# Patient Record
Sex: FEMALE
[De-identification: names, ages, dates, MRNs, and addresses within clinical notes are randomized per-mention and may not be internally consistent; named-entity substitution may affect disease eponyms.]

---

## 2024-08-13 ENCOUNTER — APPOINTMENT (OUTPATIENT)
Dept: NEUROLOGY | Facility: CLINIC | Age: 44
End: 2024-08-13

## 2024-08-13 VITALS
HEIGHT: 62 IN | OXYGEN SATURATION: 97 % | DIASTOLIC BLOOD PRESSURE: 68 MMHG | HEART RATE: 78 BPM | SYSTOLIC BLOOD PRESSURE: 105 MMHG | BODY MASS INDEX: 28.71 KG/M2 | WEIGHT: 156 LBS

## 2024-08-13 DIAGNOSIS — M79.2 NEURALGIA AND NEURITIS, UNSPECIFIED: ICD-10-CM

## 2024-08-13 DIAGNOSIS — Z86.39 PERSONAL HISTORY OF OTHER ENDOCRINE, NUTRITIONAL AND METABOLIC DISEASE: ICD-10-CM

## 2024-08-13 DIAGNOSIS — G57.10 MERALGIA PARESTHETICA, UNSPECIFIED LOWER LIMB: ICD-10-CM

## 2024-08-13 DIAGNOSIS — R68.89 OTHER GENERAL SYMPTOMS AND SIGNS: ICD-10-CM

## 2024-08-13 PROBLEM — Z00.00 ENCOUNTER FOR PREVENTIVE HEALTH EXAMINATION: Status: ACTIVE | Noted: 2024-08-13

## 2024-08-13 PROCEDURE — 99204 OFFICE O/P NEW MOD 45 MIN: CPT

## 2024-08-13 RX ORDER — CYANOCOBALAMIN (VITAMIN B-12) 1000 MCG
1000 TABLET ORAL DAILY
Qty: 30 | Refills: 5 | Status: ACTIVE | COMMUNITY
Start: 2024-08-13 | End: 1900-01-01

## 2024-08-13 RX ORDER — LIDOCAINE 5% 700 MG/1
5 PATCH TOPICAL
Qty: 30 | Refills: 2 | Status: ACTIVE | COMMUNITY
Start: 2024-08-13 | End: 1900-01-01

## 2024-08-13 NOTE — HISTORY OF PRESENT ILLNESS
[FreeTextEntry1] : 42 yo RH Female with PMHx of iron deficiency anemia came in today for evaluation of forgetfulness and RIght lateral thigh pain.   Forgetfulness start almost 1 year back when she and  started to notice. She forgets where she left her keys, last month left the stove on, forgot she had to go the concert, Not forgetting any streets. She is also forgetting some short term and long-term events  She also has Right lateral thigh numbness, pain. Improves with walking, worse with sitting and during her periods. She also has transiet Right medial 2 toes numbness, denies weakness and dizziness. She works as school safety and has been wearing heavy belt and have noticed pain on lateral thigh since then.   Had postpartum blues after all 3 childbirth. Has 3 kids.   Only take ferritin daily, no other meds.   Labs done in 05/2024 (not on [facility]) b12 387  folate 12 tsh 1.39  Family hx: mother has hx of brain aneurysm, aunt has hx of alzhiemers in 65-70 year old  PCP: Dr. Felecia Mariscal

## 2024-08-13 NOTE — PHYSICAL EXAM
[FreeTextEntry1] : NEURO MENTAL STATUS: AAOx3, memory intact, fund of knowledge appropriate. MOCA exam 29/30 LANG/SPEECH: Naming and repetition intact, fluent, follows 3-step commands CRANIAL NERVES: II: Pupils equal and reactive, no RAPD, no VF deficits III, IV, VI: EOM intact, no gaze preference or deviation, no nystagmus. V: normal sensation in V1, V2, and V3 segments bilaterally VII: no asymmetry, no nasolabial fold flattening VIII: normal hearing to speech XI: 5/5 head turn and 5/5 shoulder shrug bilaterally XII: midline tongue protrusion MOTOR: 5/5 throughout REFLEXES: 2/4 throughout SENSORY: Normal to touch, pinprick, vibration, temp all limbs Romberg absent COORD: Normal finger to nose and heel to shin, no tremor, no dysmetria GAIT: Narrow based

## 2024-08-13 NOTE — ASSESSMENT
[FreeTextEntry1] : 42 yo RH Female with PMHx of iron deficiency anemia came in today for evaluation of forgetfulness and RIght lateral thigh pain. She is complaining of short term and long term forgetfulness, doesn't affect her job. MOCA was 29/30, depression screening neg. However, she never had any head imaging, so will get MRI brain. B12, TSH normal, b12 is lower range. She also likely has meralgia paresthetica on R lateral thigh.  #Forgetfulness - obtain MRI brain non con - start b12 1000mcg daily - no need for neuropsyc testing for now  #Meralgia paresthetica - lidocaine patch - lifestyle modifications using loose cloths and stop wearing heavy belt  RTC in 3 months

## 2024-08-13 NOTE — END OF VISIT
[] : Resident [FreeTextEntry3] : 43-year-old woman presenting with right lateral thigh burning dysesthesia in the distribution of the superficial femoral cutaneous nerve compatible with meralgia paresthetica.  Recommended topical lidocaine and avoiding tight fitting and compressive clothes around the waist.  Also complained of forgetfulness despite performing well on the MoCA 29 out of 30.  She declined neuropsychological testing.  She had right first and second digit numbness of the right foot that did not have a clear peripheral distribution.  Given the cognitive complaints and poorly localizable sensory complaints of the right foot we agreed to perform a noncontrast MRI of the brain to rule out structural lesion.

## 2024-10-21 ENCOUNTER — NON-APPOINTMENT (OUTPATIENT)
Age: 44
End: 2024-10-21

## 2024-11-08 ENCOUNTER — RESULT REVIEW (OUTPATIENT)
Age: 44
End: 2024-11-08

## 2024-11-08 ENCOUNTER — OUTPATIENT (OUTPATIENT)
Dept: OUTPATIENT SERVICES | Facility: HOSPITAL | Age: 44
LOS: 1 days | End: 2024-11-08
Payer: COMMERCIAL

## 2024-11-08 DIAGNOSIS — Z00.8 ENCOUNTER FOR OTHER GENERAL EXAMINATION: ICD-10-CM

## 2024-11-08 DIAGNOSIS — R68.89 OTHER GENERAL SYMPTOMS AND SIGNS: ICD-10-CM

## 2024-11-08 PROCEDURE — 70551 MRI BRAIN STEM W/O DYE: CPT

## 2024-11-08 PROCEDURE — 70551 MRI BRAIN STEM W/O DYE: CPT | Mod: 26

## 2024-11-09 DIAGNOSIS — R68.89 OTHER GENERAL SYMPTOMS AND SIGNS: ICD-10-CM

## 2024-11-19 ENCOUNTER — APPOINTMENT (OUTPATIENT)
Dept: NEUROLOGY | Facility: CLINIC | Age: 44
End: 2024-11-19
Payer: COMMERCIAL

## 2024-11-19 ENCOUNTER — OUTPATIENT (OUTPATIENT)
Dept: OUTPATIENT SERVICES | Facility: HOSPITAL | Age: 44
LOS: 1 days | End: 2024-11-19
Payer: COMMERCIAL

## 2024-11-19 VITALS
OXYGEN SATURATION: 97 % | BODY MASS INDEX: 28.71 KG/M2 | HEART RATE: 73 BPM | WEIGHT: 156 LBS | HEIGHT: 62 IN | DIASTOLIC BLOOD PRESSURE: 80 MMHG | TEMPERATURE: 98.3 F | SYSTOLIC BLOOD PRESSURE: 118 MMHG

## 2024-11-19 DIAGNOSIS — G57.10 MERALGIA PARESTHETICA, UNSPECIFIED LOWER LIMB: ICD-10-CM

## 2024-11-19 DIAGNOSIS — R68.89 OTHER GENERAL SYMPTOMS AND SIGNS: ICD-10-CM

## 2024-11-19 DIAGNOSIS — Z00.00 ENCOUNTER FOR GENERAL ADULT MEDICAL EXAMINATION WITHOUT ABNORMAL FINDINGS: ICD-10-CM

## 2024-11-19 PROCEDURE — 99214 OFFICE O/P EST MOD 30 MIN: CPT

## 2024-11-19 PROCEDURE — 99204 OFFICE O/P NEW MOD 45 MIN: CPT
